# Patient Record
Sex: MALE | Race: WHITE | NOT HISPANIC OR LATINO | Employment: FULL TIME | URBAN - METROPOLITAN AREA
[De-identification: names, ages, dates, MRNs, and addresses within clinical notes are randomized per-mention and may not be internally consistent; named-entity substitution may affect disease eponyms.]

---

## 2023-10-14 DIAGNOSIS — I10 PRIMARY HYPERTENSION: Primary | ICD-10-CM

## 2023-10-14 RX ORDER — LISINOPRIL 5 MG/1
5 TABLET ORAL DAILY
Qty: 30 TABLET | Refills: 11 | Status: SHIPPED | OUTPATIENT
Start: 2023-10-14 | End: 2024-01-17 | Stop reason: SDUPTHER

## 2023-11-08 DIAGNOSIS — E78.5 HYPERLIPIDEMIA, UNSPECIFIED HYPERLIPIDEMIA TYPE: Primary | ICD-10-CM

## 2023-11-08 RX ORDER — ATORVASTATIN CALCIUM 80 MG/1
80 TABLET, FILM COATED ORAL NIGHTLY
Qty: 30 TABLET | Refills: 0 | Status: SHIPPED | OUTPATIENT
Start: 2023-11-08 | End: 2024-01-05

## 2024-01-03 DIAGNOSIS — E78.5 HYPERLIPIDEMIA, UNSPECIFIED HYPERLIPIDEMIA TYPE: ICD-10-CM

## 2024-01-05 RX ORDER — ATORVASTATIN CALCIUM 80 MG/1
80 TABLET, FILM COATED ORAL NIGHTLY
Qty: 30 TABLET | Refills: 0 | Status: SHIPPED | OUTPATIENT
Start: 2024-01-05 | End: 2024-01-17 | Stop reason: SDUPTHER

## 2024-01-16 PROBLEM — F41.9 ANXIETY: Status: ACTIVE | Noted: 2024-01-16

## 2024-01-16 PROBLEM — E66.811 OBESITY, CLASS I, BMI 30-34.9: Status: RESOLVED | Noted: 2018-11-29 | Resolved: 2024-01-16

## 2024-01-16 PROBLEM — I10 HYPERTENSION: Status: ACTIVE | Noted: 2024-01-16

## 2024-01-16 PROBLEM — K91.850 ILEAL POUCHITIS (MULTI): Status: RESOLVED | Noted: 2024-01-16 | Resolved: 2024-01-16

## 2024-01-16 PROBLEM — E66.9 OBESITY, CLASS I, BMI 30-34.9: Status: RESOLVED | Noted: 2018-11-29 | Resolved: 2024-01-16

## 2024-01-16 PROBLEM — R60.9 PERIPHERAL EDEMA: Status: RESOLVED | Noted: 2024-01-16 | Resolved: 2024-01-16

## 2024-01-16 PROBLEM — R39.9 LOWER URINARY TRACT SYMPTOMS: Status: RESOLVED | Noted: 2024-01-16 | Resolved: 2024-01-16

## 2024-01-16 PROBLEM — E78.5 DYSLIPIDEMIA: Status: ACTIVE | Noted: 2024-01-16

## 2024-01-16 PROBLEM — L40.9 PSORIASIS: Status: ACTIVE | Noted: 2024-01-16

## 2024-01-16 PROBLEM — K21.9 ACID REFLUX: Status: ACTIVE | Noted: 2024-01-16

## 2024-01-16 PROBLEM — R60.0 PERIPHERAL EDEMA: Status: RESOLVED | Noted: 2024-01-16 | Resolved: 2024-01-16

## 2024-01-16 PROBLEM — E78.5 HYPERLIPIDEMIA: Status: ACTIVE | Noted: 2024-01-16

## 2024-01-16 PROBLEM — G47.33 OBSTRUCTIVE SLEEP APNEA SYNDROME: Status: ACTIVE | Noted: 2024-01-16

## 2024-01-16 PROBLEM — Z90.49 H/O TOTAL COLECTOMY: Status: RESOLVED | Noted: 2024-01-16 | Resolved: 2024-01-16

## 2024-01-16 PROBLEM — K70.0 FATTY LIVER, ALCOHOLIC: Status: ACTIVE | Noted: 2024-01-16

## 2024-01-16 PROBLEM — I21.4 NON-ST ELEVATION MYOCARDIAL INFARCTION (NSTEMI) IN RECOVERY PHASE (MULTI): Status: RESOLVED | Noted: 2024-01-16 | Resolved: 2024-01-16

## 2024-01-17 ENCOUNTER — LAB (OUTPATIENT)
Dept: LAB | Facility: LAB | Age: 59
End: 2024-01-17
Payer: COMMERCIAL

## 2024-01-17 ENCOUNTER — OFFICE VISIT (OUTPATIENT)
Dept: PRIMARY CARE | Facility: CLINIC | Age: 59
End: 2024-01-17
Payer: COMMERCIAL

## 2024-01-17 VITALS
WEIGHT: 216 LBS | HEIGHT: 68 IN | BODY MASS INDEX: 32.74 KG/M2 | OXYGEN SATURATION: 97 % | HEART RATE: 98 BPM | SYSTOLIC BLOOD PRESSURE: 124 MMHG | DIASTOLIC BLOOD PRESSURE: 82 MMHG

## 2024-01-17 DIAGNOSIS — K21.9 GASTROESOPHAGEAL REFLUX DISEASE, UNSPECIFIED WHETHER ESOPHAGITIS PRESENT: ICD-10-CM

## 2024-01-17 DIAGNOSIS — Z00.00 HEALTHCARE MAINTENANCE: Primary | ICD-10-CM

## 2024-01-17 DIAGNOSIS — Z00.00 HEALTHCARE MAINTENANCE: ICD-10-CM

## 2024-01-17 DIAGNOSIS — I10 PRIMARY HYPERTENSION: ICD-10-CM

## 2024-01-17 DIAGNOSIS — E78.5 HYPERLIPIDEMIA, UNSPECIFIED HYPERLIPIDEMIA TYPE: ICD-10-CM

## 2024-01-17 LAB
ALBUMIN SERPL BCP-MCNC: 4.7 G/DL (ref 3.4–5)
ALP SERPL-CCNC: 66 U/L (ref 33–136)
ALT SERPL W P-5'-P-CCNC: 32 U/L (ref 10–52)
ANION GAP SERPL CALC-SCNC: 14 MMOL/L (ref 10–20)
AST SERPL W P-5'-P-CCNC: 27 U/L (ref 9–39)
BASOPHILS # BLD AUTO: 0.05 X10*3/UL (ref 0–0.1)
BASOPHILS NFR BLD AUTO: 0.9 %
BILIRUB SERPL-MCNC: 0.7 MG/DL (ref 0–1.2)
BUN SERPL-MCNC: 10 MG/DL (ref 6–23)
CALCIUM SERPL-MCNC: 10 MG/DL (ref 8.6–10.6)
CHLORIDE SERPL-SCNC: 103 MMOL/L (ref 98–107)
CHOLEST SERPL-MCNC: 166 MG/DL (ref 0–199)
CHOLESTEROL/HDL RATIO: 3.6
CO2 SERPL-SCNC: 26 MMOL/L (ref 21–32)
CREAT SERPL-MCNC: 1.13 MG/DL (ref 0.5–1.3)
EGFRCR SERPLBLD CKD-EPI 2021: 74 ML/MIN/1.73M*2
EOSINOPHIL # BLD AUTO: 0.32 X10*3/UL (ref 0–0.7)
EOSINOPHIL NFR BLD AUTO: 5.9 %
ERYTHROCYTE [DISTWIDTH] IN BLOOD BY AUTOMATED COUNT: 13 % (ref 11.5–14.5)
EST. AVERAGE GLUCOSE BLD GHB EST-MCNC: 111 MG/DL
GLUCOSE SERPL-MCNC: 98 MG/DL (ref 74–99)
HBA1C MFR BLD: 5.5 %
HCT VFR BLD AUTO: 44.4 % (ref 41–52)
HDLC SERPL-MCNC: 46.5 MG/DL
HGB BLD-MCNC: 14.4 G/DL (ref 13.5–17.5)
IMM GRANULOCYTES # BLD AUTO: 0.03 X10*3/UL (ref 0–0.7)
IMM GRANULOCYTES NFR BLD AUTO: 0.6 % (ref 0–0.9)
LDLC SERPL CALC-MCNC: 84 MG/DL
LYMPHOCYTES # BLD AUTO: 1.53 X10*3/UL (ref 1.2–4.8)
LYMPHOCYTES NFR BLD AUTO: 28.1 %
MCH RBC QN AUTO: 29.9 PG (ref 26–34)
MCHC RBC AUTO-ENTMCNC: 32.4 G/DL (ref 32–36)
MCV RBC AUTO: 92 FL (ref 80–100)
MONOCYTES # BLD AUTO: 0.41 X10*3/UL (ref 0.1–1)
MONOCYTES NFR BLD AUTO: 7.5 %
NEUTROPHILS # BLD AUTO: 3.1 X10*3/UL (ref 1.2–7.7)
NEUTROPHILS NFR BLD AUTO: 57 %
NON HDL CHOLESTEROL: 120 MG/DL (ref 0–149)
NRBC BLD-RTO: 0 /100 WBCS (ref 0–0)
PLATELET # BLD AUTO: 295 X10*3/UL (ref 150–450)
POTASSIUM SERPL-SCNC: 4.2 MMOL/L (ref 3.5–5.3)
PROT SERPL-MCNC: 7.6 G/DL (ref 6.4–8.2)
PSA SERPL-MCNC: 0.65 NG/ML
RBC # BLD AUTO: 4.81 X10*6/UL (ref 4.5–5.9)
SODIUM SERPL-SCNC: 139 MMOL/L (ref 136–145)
T4 FREE SERPL-MCNC: 1 NG/DL (ref 0.78–1.48)
TRIGL SERPL-MCNC: 178 MG/DL (ref 0–149)
TSH SERPL-ACNC: 2.26 MIU/L (ref 0.44–3.98)
VLDL: 36 MG/DL (ref 0–40)
WBC # BLD AUTO: 5.4 X10*3/UL (ref 4.4–11.3)

## 2024-01-17 PROCEDURE — 83036 HEMOGLOBIN GLYCOSYLATED A1C: CPT

## 2024-01-17 PROCEDURE — 80061 LIPID PANEL: CPT

## 2024-01-17 PROCEDURE — 3074F SYST BP LT 130 MM HG: CPT | Performed by: INTERNAL MEDICINE

## 2024-01-17 PROCEDURE — 3079F DIAST BP 80-89 MM HG: CPT | Performed by: INTERNAL MEDICINE

## 2024-01-17 PROCEDURE — 85025 COMPLETE CBC W/AUTO DIFF WBC: CPT

## 2024-01-17 PROCEDURE — 80053 COMPREHEN METABOLIC PANEL: CPT

## 2024-01-17 PROCEDURE — 1036F TOBACCO NON-USER: CPT | Performed by: INTERNAL MEDICINE

## 2024-01-17 PROCEDURE — 84443 ASSAY THYROID STIM HORMONE: CPT

## 2024-01-17 PROCEDURE — 36415 COLL VENOUS BLD VENIPUNCTURE: CPT

## 2024-01-17 PROCEDURE — 84153 ASSAY OF PSA TOTAL: CPT

## 2024-01-17 PROCEDURE — 99214 OFFICE O/P EST MOD 30 MIN: CPT | Performed by: INTERNAL MEDICINE

## 2024-01-17 PROCEDURE — 84439 ASSAY OF FREE THYROXINE: CPT

## 2024-01-17 RX ORDER — LISINOPRIL 5 MG/1
5 TABLET ORAL DAILY
Qty: 30 TABLET | Refills: 11 | Status: SHIPPED | OUTPATIENT
Start: 2024-01-17 | End: 2025-01-16

## 2024-01-17 RX ORDER — ATORVASTATIN CALCIUM 80 MG/1
80 TABLET, FILM COATED ORAL NIGHTLY
Qty: 30 TABLET | Refills: 0 | Status: SHIPPED | OUTPATIENT
Start: 2024-01-17 | End: 2024-03-06

## 2024-01-17 RX ORDER — PANTOPRAZOLE SODIUM 40 MG/1
40 TABLET, DELAYED RELEASE ORAL DAILY
Qty: 90 TABLET | Refills: 1 | Status: SHIPPED | OUTPATIENT
Start: 2024-01-17 | End: 2024-04-16

## 2024-01-17 RX ORDER — NAPROXEN SODIUM 220 MG/1
1 TABLET, FILM COATED ORAL DAILY
COMMUNITY
Start: 2021-07-20

## 2024-01-17 RX ORDER — LOPERAMIDE HYDROCHLORIDE 2 MG/1
4 CAPSULE ORAL 4 TIMES DAILY
COMMUNITY

## 2024-01-17 RX ORDER — METOPROLOL SUCCINATE 25 MG/1
25 TABLET, EXTENDED RELEASE ORAL DAILY
Qty: 90 TABLET | Refills: 1 | Status: SHIPPED | OUTPATIENT
Start: 2024-01-17

## 2024-01-17 RX ORDER — FLUOCINONIDE TOPICAL SOLUTION USP, 0.05% 0.5 MG/ML
SOLUTION TOPICAL
COMMUNITY

## 2024-01-17 RX ORDER — METOPROLOL SUCCINATE 25 MG/1
25 TABLET, EXTENDED RELEASE ORAL DAILY
COMMUNITY
End: 2024-01-17 | Stop reason: SDUPTHER

## 2024-01-17 RX ORDER — CLOBETASOL PROPIONATE 0.5 MG/G
CREAM TOPICAL
COMMUNITY

## 2024-01-17 ASSESSMENT — PATIENT HEALTH QUESTIONNAIRE - PHQ9
2. FEELING DOWN, DEPRESSED OR HOPELESS: NOT AT ALL
1. LITTLE INTEREST OR PLEASURE IN DOING THINGS: NOT AT ALL
SUM OF ALL RESPONSES TO PHQ9 QUESTIONS 1 AND 2: 0

## 2024-01-17 NOTE — PROGRESS NOTES
Subjective   Patient ID: Marcelino Mallory is a 59 y.o. male who presents for check up (Wants blood work done.) and Med Refill.  HPI  male who presents to establish care. Past medical history of hypertension, dyslipidemia, acid reflux, ulcerative colitis, and MARK. Surgical history total colectomy and ileostomy, restorative proctocolectomy/J-pouch in 2015. NSTEMI with KEAGAN in 2021.     Patient states he had chronic tachycardia high pulse over the years grade 3 out of 10 nothing makes better or worse states he is supposed to see cardiology in the next couple weeks  Denies chest pain dyspnea lightheadedness dyspnea syncope presyncope              Health Maintenance:      Colonoscopy: Unknown      Mammogram:      Pelvic/Pap:      Low dose chest CT:      Aorta duplex:      Optho:      Podiatry:        Vaccines:      Prevnar 20:      Prevnar 13:      Pneumovax 23:      Tdap:      Shingrix:      COVID:      Influenza:        ROS:      General: Fatigue he states it was better for 2 weeks after having a stent 2 years ago but then it came back some generalized fatigue denies fever/chills/weight loss      Head: denies HA/trauma/masses/dizziness      Eyes: denies vision change/loss of vision/blurry vision/diplopia/eye pain      Ears: denies hearing loss/tinnitus/otalgia/otorrhea      Nose: denies nasal drainage/anosmia      Throat: denies dysphagia/odynophagia      Lymphatics: denies lymph node swelling      Cardiac: Chronic tachycardia over the years denies CP/palpitations/orthopnea/PND      Pulmonary: denies dyspnea/cough/wheezing      GI: Chronic frequent bowel movements 20 a day since having J-pouch colectomy uncontrolled acidic reflux in the back of the throat is better with the PPI before he states this symptom is different than when he had a heart attack it does not feel the same feels like acid reflux chronic intermittent hernias since having J-pouch hernia surgery years ago asymptomatic denies abd  "pain/n/v/diarrhea/melena/hematochezia/hematemesis      : denies dysuria/hematuria/change frequency      Genital: denies genital discharge/lesions      Skin: denies rashes/lesions/masses      MSK: denies weakness/swelling/edema/gait imbalance/pain      Neuro: denies paresthesias/seizures/dysarthria      Psych: denies depression/anxiety/suicidal or homicidal ideations            Objective   /82   Pulse 98   Ht 1.727 m (5' 8\")   Wt 98 kg (216 lb)   SpO2 97%   BMI 32.84 kg/m²      Physical Exam:     General: AO3, NAD     Head: atraumatic/NC     Eyes: EOMI/PERRLA. Negative APD     Ears: TM pearly gray, EAC clear. No lesions or erythema     Nose: symmetric nares, no discharge     Throat: trachea midline, uvula midline pink mucosa. No thyromegaly     Lymphatics: no cervical/supraclavicular/ant or posterior cervical adenopathy/axillary/inguinal adenopathy     Breast: not examined     Chest: no deformity or tenderness to palpation     Pulm: CTA b/l, no wheeze/rhonchi/rales. nonlabored     Cardiac: RRR +s1s2, no m/r/g.      GI: Abdominal scar is clean dry intact reducible ventral hernia times two 1 cm soft, NT/ND. Normoactive Bsx4. No rebound/guarding.     Rectal: no examined     MSK: 5/5 strength UE LE. No edema/clubbing/cyanosis     Skin: no rashes/lesions     Vascular: 2+ palp DP PT radials b/l. Negative carotid bruit     Neuro: CNII-XII intact. No focal deficits. Reflexes 2/4 brachioradialis bicep tricep patellar achilles. Finger to nose intact.     Psych: appropriate mood/affect                    No results found for: \"BMPR1A\", \"CBCDIF\"      Assessment/Plan   Diagnoses and all orders for this visit:  Healthcare maintenance  -     CBC and Auto Differential; Future  -     Comprehensive Metabolic Panel; Future  -     Hemoglobin A1C; Future  -     Lipid Panel; Future  -     Thyroxine, Free; Future  -     Thyroid Stimulating Hormone; Future  -     Prostate Specific Antigen, Screen; Future  Hyperlipidemia, " unspecified hyperlipidemia type  -     atorvastatin (Lipitor) 80 mg tablet; Take 1 tablet (80 mg) by mouth once daily at bedtime.  Primary hypertension  -     lisinopril 5 mg tablet; Take 1 tablet (5 mg) by mouth once daily.  -     metoprolol succinate XL (Toprol-XL) 25 mg 24 hr tablet; Take 1 tablet (25 mg) by mouth once daily.  Gastroesophageal reflux disease, unspecified whether esophagitis present  -     pantoprazole (ProtoNix) 40 mg EC tablet; Take 1 tablet (40 mg) by mouth once daily. Do not crush, chew, or split.       Recommend calling and following up with colorectal surgery Dr. Salmeron as he stated you prefer to go through the GI surgeons regarding a colonoscopy evaluation    Call and follow-up with urology    Call follow-up with cardiology    Thank you for making appointment today Marcelino    Please follow-up 6 months    Abebe Chaudhari DO, VANESSA Gallardo MA

## 2024-01-25 ENCOUNTER — TELEPHONE (OUTPATIENT)
Dept: PRIMARY CARE | Facility: CLINIC | Age: 59
End: 2024-01-25
Payer: COMMERCIAL

## 2024-01-25 NOTE — TELEPHONE ENCOUNTER
L/M for patient with results.    ----- Message from Abebe Chaudhari DO sent at 1/24/2024  5:45 PM EST -----  Caridad  Please notify currently available lab work is stable normal if any further questions or would like an in office result review please schedule follow-up in the next 2 to 4 weeks thank you

## 2024-03-05 DIAGNOSIS — E78.5 HYPERLIPIDEMIA, UNSPECIFIED HYPERLIPIDEMIA TYPE: ICD-10-CM

## 2024-03-06 RX ORDER — ATORVASTATIN CALCIUM 80 MG/1
TABLET, FILM COATED ORAL
Qty: 90 TABLET | Refills: 1 | Status: SHIPPED | OUTPATIENT
Start: 2024-03-06

## 2024-03-12 ENCOUNTER — OFFICE VISIT (OUTPATIENT)
Dept: SURGERY | Facility: CLINIC | Age: 59
End: 2024-03-12
Payer: COMMERCIAL

## 2024-03-12 VITALS
DIASTOLIC BLOOD PRESSURE: 75 MMHG | WEIGHT: 217 LBS | SYSTOLIC BLOOD PRESSURE: 133 MMHG | TEMPERATURE: 97.8 F | OXYGEN SATURATION: 95 % | HEART RATE: 93 BPM | HEIGHT: 68 IN | BODY MASS INDEX: 32.89 KG/M2

## 2024-03-12 DIAGNOSIS — K91.850 POUCHITIS (MULTI): Primary | ICD-10-CM

## 2024-03-12 PROCEDURE — 3078F DIAST BP <80 MM HG: CPT | Performed by: COLON & RECTAL SURGERY

## 2024-03-12 PROCEDURE — 1036F TOBACCO NON-USER: CPT | Performed by: COLON & RECTAL SURGERY

## 2024-03-12 PROCEDURE — 99212 OFFICE O/P EST SF 10 MIN: CPT | Performed by: COLON & RECTAL SURGERY

## 2024-03-12 PROCEDURE — 3075F SYST BP GE 130 - 139MM HG: CPT | Performed by: COLON & RECTAL SURGERY

## 2024-03-12 RX ORDER — METRONIDAZOLE 500 MG/1
500 TABLET ORAL 3 TIMES DAILY
Qty: 30 TABLET | Refills: 0 | Status: SHIPPED | OUTPATIENT
Start: 2024-03-12 | End: 2024-03-22

## 2024-03-12 ASSESSMENT — ENCOUNTER SYMPTOMS
CONFUSION: 0
VOMITING: 0
ABDOMINAL DISTENTION: 0
UNEXPECTED WEIGHT CHANGE: 0
LIGHT-HEADEDNESS: 0
CONSTIPATION: 0
ABDOMINAL PAIN: 0
DIFFICULTY URINATING: 0
RECTAL PAIN: 0
ARTHRALGIAS: 0
ACTIVITY CHANGE: 0
DYSURIA: 0
SORE THROAT: 0
DIARRHEA: 0
OCCASIONAL FEELINGS OF UNSTEADINESS: 0
WOUND: 0
DEPRESSION: 0
PALPITATIONS: 0
FEVER: 0
DIZZINESS: 0
WEAKNESS: 0
POLYDIPSIA: 0
APPETITE CHANGE: 0
FATIGUE: 0
ANAL BLEEDING: 0
ADENOPATHY: 0
LOSS OF SENSATION IN FEET: 0
BLOOD IN STOOL: 0
SHORTNESS OF BREATH: 0
NAUSEA: 0

## 2024-03-12 ASSESSMENT — PATIENT HEALTH QUESTIONNAIRE - PHQ9
1. LITTLE INTEREST OR PLEASURE IN DOING THINGS: NOT AT ALL
2. FEELING DOWN, DEPRESSED OR HOPELESS: NOT AT ALL
SUM OF ALL RESPONSES TO PHQ9 QUESTIONS 1 & 2: 0

## 2024-03-12 ASSESSMENT — PAIN SCALES - GENERAL: PAINLEVEL: 0-NO PAIN

## 2024-03-12 NOTE — PROGRESS NOTES
Chief Complaint:  ileal pouch      History Of Present Illness  Marcelino Mallory is a 59 y.o. male with history of Ulcerative Colitis who is status post 3 stage restorative proctocolectomy in 2015. Last pouchoscopy on 2/23/21 showed: Mild to moderate pouchitis with erythema, scattered apthous ulcers.     Presents to clinic today for pouch surveillance.     No changes. 20 bm/day. No incontinence.      Past Medical History  He has a past medical history of Abdominal adhesions (07/06/2015), Chest pain (07/09/2015), Diarrhea (07/16/2015), H/O total colectomy (01/16/2024), Hypokalemia (07/09/2015), Hypophosphatemia (06/30/2014), Lower urinary tract symptoms (01/16/2024), Malnutrition of moderate degree (CMS/Formerly [facility]) (07/16/2015), Obesity, Class I, BMI 30-34.9 (11/29/2018), Oral thrush (07/15/2015), Postoperative ileus (CMS/Formerly [facility]) (07/14/2015), Tachycardia (06/28/2014), and Ulcerative colitis (CMS/Formerly [facility]).    Surgical History  He has a past surgical history that includes Other surgical history (02/23/2021) and Other surgical history (02/23/2021).     Social History  He reports that he quit smoking about 30 years ago. His smoking use included cigarettes. He has a 30.00 pack-year smoking history. He has never used smokeless tobacco. He reports current alcohol use of about 12.0 standard drinks of alcohol per week. He reports that he does not use drugs.    Family History  No family history on file.     Allergies  Morphine (bulk)    Home Medications  Prior to Admission medications    Medication Sig Start Date End Date Taking? Authorizing Provider   aspirin 81 mg chewable tablet Chew 1 tablet (81 mg) once daily. 7/20/21   Historical Provider, MD   atorvastatin (Lipitor) 80 mg tablet TAKE ONE TABLET (80 MG) BY MOUTH ONCE DAILY AT BEDTIME. NEEDS APPOINTMENT FOR FURTHER REFILLS 3/6/24   Abebe Chaudhari,    clobetasol (Temovate) 0.05 % cream apply to affected area twice a day if needed for FLARES    Historical Provider, MD   fluocinonide  (Lidex) 0.05 % external solution apply to affected area ON scalp twice a day if needed    Historical Provider, MD   lisinopril 5 mg tablet Take 1 tablet (5 mg) by mouth once daily. 1/17/24 1/16/25  Abebe D Chaudhari, DO   loperamide (Imodium) 2 mg capsule Take 2 capsules (4 mg) by mouth 4 times a day.    Historical Provider, MD   metoprolol succinate XL (Toprol-XL) 25 mg 24 hr tablet Take 1 tablet (25 mg) by mouth once daily. 1/17/24   Abebe D Chaudhari, DO   pantoprazole (ProtoNix) 40 mg EC tablet Take 1 tablet (40 mg) by mouth once daily. Do not crush, chew, or split. 1/17/24 4/16/24  Abebe D Chaudhari, DO   atorvastatin (Lipitor) 80 mg tablet Take 1 tablet (80 mg) by mouth once daily at bedtime. 1/17/24 3/6/24  Abebe D Chaudhari, DO         Review of Systems   Constitutional:  Negative for activity change, appetite change, fatigue, fever and unexpected weight change.   HENT:  Negative for sore throat.    Eyes:  Negative for visual disturbance.   Respiratory:  Negative for shortness of breath.    Cardiovascular:  Negative for chest pain, palpitations and leg swelling.   Gastrointestinal:  Negative for abdominal distention, abdominal pain, anal bleeding, blood in stool, constipation, diarrhea, nausea, rectal pain and vomiting.   Endocrine: Negative for polydipsia.   Genitourinary:  Negative for difficulty urinating and dysuria.   Musculoskeletal:  Negative for arthralgias.   Skin:  Negative for wound.   Neurological:  Negative for dizziness, weakness and light-headedness.   Hematological:  Negative for adenopathy.   Psychiatric/Behavioral:  Negative for confusion.      Pouchoscopy In Clinic    Date/Time: 3/12/2024 1:43 PM    Performed by: Stefan Salmeron MD  Authorized by: Stefan Salmeron MD    Consent:     Consent obtained:  Written    Consent given by:  Patient  Procedure specific details:      Flexible pouchoscopy:  Patient signed consent. Digital exam with normal IPAA. Flexible scope inserted thru anus and advanced into pouch.  IPAA widely patient. Normal JOSHUA. Pouch mucosa mildly inflamed with contact bleeding.. Afferent limb inspected and is normal. Patient tolerated procedure well.        Imaging:  No results found.       Labs:   Lab Results   Component Value Date    AST 27 01/17/2024    ALT 32 01/17/2024    ALKPHOS 66 01/17/2024    PROT 7.6 01/17/2024    ALBUMIN 4.7 01/17/2024          Physical Exam  Vitals reviewed. Exam conducted with a chaperone present.   Constitutional:       Appearance: Normal appearance.   Cardiovascular:      Rate and Rhythm: Normal rate and regular rhythm.   Pulmonary:      Effort: Pulmonary effort is normal.      Breath sounds: Normal breath sounds.   Abdominal:      General: Abdomen is flat. There is no distension.      Palpations: Abdomen is soft. There is no mass.      Tenderness: There is no abdominal tenderness.      Hernia: No hernia is present.   Genitourinary:     Rectum: Normal.   Neurological:      General: No focal deficit present.      Mental Status: He is alert.   Psychiatric:         Behavior: Behavior normal.            Last Recorded Vitals  There were no vitals taken for this visit.      Assessment/Plan  Pouchitis on today's scope. Will treat with 10 days of flagyl and reassess number of bms.        Dr. Stefan Salmeron   3/12/2024

## 2024-07-17 ENCOUNTER — APPOINTMENT (OUTPATIENT)
Dept: PRIMARY CARE | Facility: CLINIC | Age: 59
End: 2024-07-17
Payer: COMMERCIAL

## 2024-07-17 VITALS
DIASTOLIC BLOOD PRESSURE: 74 MMHG | SYSTOLIC BLOOD PRESSURE: 136 MMHG | HEART RATE: 98 BPM | BODY MASS INDEX: 32.99 KG/M2 | WEIGHT: 217 LBS

## 2024-07-17 DIAGNOSIS — M89.8X1 CLAVICLE PAIN: Primary | ICD-10-CM

## 2024-07-17 PROCEDURE — 99214 OFFICE O/P EST MOD 30 MIN: CPT | Performed by: INTERNAL MEDICINE

## 2024-07-17 PROCEDURE — 1036F TOBACCO NON-USER: CPT | Performed by: INTERNAL MEDICINE

## 2024-07-17 PROCEDURE — 3078F DIAST BP <80 MM HG: CPT | Performed by: INTERNAL MEDICINE

## 2024-07-17 PROCEDURE — 3075F SYST BP GE 130 - 139MM HG: CPT | Performed by: INTERNAL MEDICINE

## 2024-07-17 NOTE — PROGRESS NOTES
Subjective   Patient ID: Marcelino Mallory is a 59 y.o. male who presents for Follow-up and Med Refill.  HPI  male who presents to establish care. Past medical history of hypertension, dyslipidemia, acid reflux, ulcerative colitis, and MARK. Surgical history total colectomy and ileostomy, restorative proctocolectomy/J-pouch in 2015. NSTEMI with KEAGAN in 2021.     Patient describes intermittent left clavicle pain for the last 1 month none at present grade 0 out of 10 he does exercise quite a bit 5 to 6 days a week worse when he presses on the area little bit  He has some uncontrolled GERD acidic reflux occasionally he states when he had an MI in the past that was one of his symptoms no active symptoms today of acid reflux  Fatigue  Denies chest pain dyspnea nausea vomiting diaphoresis fever chills paralysis paresthesias              Health Maintenance:      Colonoscopy: 2024 pouchoscopy      Mammogram:      Pelvic/Pap:      Low dose chest CT:      Aorta duplex:      Optho:      Podiatry:        Vaccines:      Prevnar 20:      Prevnar 13:      Pneumovax 23:      Tdap:      Shingrix:      COVID:      Influenza:        ROS:      General: Fatigue denies fever/chills/weight loss      Head: denies HA/trauma/masses/dizziness      Eyes: denies vision change/loss of vision/blurry vision/diplopia/eye pain      Ears: denies hearing loss/tinnitus/otalgia/otorrhea      Nose: denies nasal drainage/anosmia      Throat: denies dysphagia/odynophagia      Lymphatics: denies lymph node swelling      Cardiac: Chronic tachycardia over the years sometimes gets into the high 90s denies CP/palpitations/orthopnea/PND      Pulmonary: denies dyspnea/cough/wheezing      GI: Intermittent acid reflux in the back of the throat not fully controlled denies abd pain/n/v/diarrhea/melena/hematochezia/hematemesis      : denies dysuria/hematuria/change frequency      Genital: denies genital discharge/lesions      Skin: denies rashes/lesions/masses      MSK:  "Some intermittent left clavicle pain over the last month denies weakness/swelling/edema/gait imbalance/pain      Neuro: denies paresthesias/seizures/dysarthria      Psych: denies depression/anxiety/suicidal or homicidal ideations            Objective   /74   Pulse 98   Wt 98.4 kg (217 lb)   BMI 32.99 kg/m²      Physical Exam:     General: AO3, NAD     Head: atraumatic/NC     Eyes: EOMI/PERRLA. Negative APD     Ears: TM pearly gray, EAC clear. No lesions or erythema     Nose: symmetric nares, no discharge     Throat: trachea midline, uvula midline pink mucosa. No thyromegaly     Lymphatics: no cervical/supraclavicular/ant or posterior cervical adenopathy/axillary/inguinal adenopathy     Breast: not examined     Chest: no deformity or tenderness to palpation     Pulm: CTA b/l, no wheeze/rhonchi/rales. nonlabored     Cardiac: RRR +s1s2, no m/r/g.      GI: Abdominal scar is clean dry intact reducible ventral hernia left upper quadrant 1 cm soft, NT/ND. Normoactive Bsx4. No rebound/guarding.     Rectal: no examined     MSK: Mild tender palpation mid to medial left clavicle 5/5 strength UE LE. No edema/clubbing/cyanosis     Skin: no rashes/lesions     Vascular: 2+ palp DP PT radials b/l. Negative carotid bruit     Neuro: CNII-XII intact. No focal deficits. Reflexes 2/4 brachioradialis bicep tricep patellar achilles. Finger to nose intact.     Psych: appropriate mood/affect                    No results found for: \"BMPR1A\", \"CBCDIF\"  Patient refused stress test with EKG testing or echo, states he is going to see the cardiologist next week we will talk about it then with the cardiologist he requested  Patient refused an x-ray of the left clavicle    Assessment/Plan   Diagnoses and all orders for this visit:  Clavicle pain  Comments:  Left-sided strain less likely fracture    Go to the ER for any chest pain shortness of breath or other concerning symptoms or severe uncontrolled acidic reflux  Follow-up with " cardiology next week as planned likely will need repeat ischemic workup stress testing versus catheterization versus other    Weight loss encouraged increase exercise 30 minutes a day     Call and follow-up with colorectal as planned recommendations noted 10 days of Flagyl      Call and follow-up with urology    Screening blood work due January 2025    Thank you for making appointment today Marcelino    Please follow-up 6 months    Abebe Chaudhari DO, VANESSA Chaudhari DO

## 2024-07-24 PROBLEM — R33.9 RETENTION OF URINE: Status: ACTIVE | Noted: 2024-07-24

## 2024-07-24 PROBLEM — K52.9 ACUTE GASTROENTERITIS: Status: ACTIVE | Noted: 2024-07-24

## 2024-07-24 PROBLEM — I25.2 HISTORY OF MYOCARDIAL INFARCTION: Status: ACTIVE | Noted: 2024-07-24

## 2024-07-24 RX ORDER — CHLORHEXIDINE GLUCONATE ORAL RINSE 1.2 MG/ML
SOLUTION DENTAL
COMMUNITY
Start: 2024-06-18

## 2024-07-25 ENCOUNTER — OFFICE VISIT (OUTPATIENT)
Dept: CARDIOLOGY | Facility: HOSPITAL | Age: 59
End: 2024-07-25
Payer: COMMERCIAL

## 2024-07-25 VITALS
SYSTOLIC BLOOD PRESSURE: 142 MMHG | DIASTOLIC BLOOD PRESSURE: 81 MMHG | OXYGEN SATURATION: 93 % | BODY MASS INDEX: 34.18 KG/M2 | HEART RATE: 92 BPM | WEIGHT: 217.8 LBS | HEIGHT: 67 IN

## 2024-07-25 DIAGNOSIS — I10 HYPERTENSION, UNSPECIFIED TYPE: Primary | ICD-10-CM

## 2024-07-25 DIAGNOSIS — R07.89 OTHER CHEST PAIN: ICD-10-CM

## 2024-07-25 DIAGNOSIS — I25.10 CORONARY ARTERY DISEASE INVOLVING NATIVE CORONARY ARTERY OF NATIVE HEART, UNSPECIFIED WHETHER ANGINA PRESENT: ICD-10-CM

## 2024-07-25 PROCEDURE — 93005 ELECTROCARDIOGRAM TRACING: CPT | Performed by: INTERNAL MEDICINE

## 2024-07-25 PROCEDURE — 93010 ELECTROCARDIOGRAM REPORT: CPT | Performed by: INTERNAL MEDICINE

## 2024-07-25 PROCEDURE — 3077F SYST BP >= 140 MM HG: CPT | Performed by: INTERNAL MEDICINE

## 2024-07-25 PROCEDURE — 99214 OFFICE O/P EST MOD 30 MIN: CPT | Performed by: INTERNAL MEDICINE

## 2024-07-25 PROCEDURE — 3079F DIAST BP 80-89 MM HG: CPT | Performed by: INTERNAL MEDICINE

## 2024-07-25 PROCEDURE — 3008F BODY MASS INDEX DOCD: CPT | Performed by: INTERNAL MEDICINE

## 2024-07-25 RX ORDER — NITROGLYCERIN 0.4 MG/1
0.4 TABLET SUBLINGUAL EVERY 5 MIN PRN
Qty: 100 TABLET | Refills: 11 | Status: SHIPPED | OUTPATIENT
Start: 2024-07-25 | End: 2025-07-25

## 2024-07-25 ASSESSMENT — PAIN SCALES - GENERAL: PAINLEVEL: 0-NO PAIN

## 2024-07-25 NOTE — PATIENT INSTRUCTIONS
Thank you for attending the Cardiology clinic at [facility] today. It was nice to meet you.     Your cardiovascular examination was within normal limits. Your ECG showed a normal heart rhythm.    Continue your current medications.     I have ordered an echocardiogram to assess your heart structure and function. Please call [phone] to schedule this test.     I have ordered a cardiac MRI to assess your heart structure and function. Please call [phone] to schedule this test.     I will notify you of your test results when available.

## 2024-07-25 NOTE — PROGRESS NOTES
Subjective   Marcelino Mallory is a 59 y.o. male who presents for cardiology follow-up on background of CAD withNSTEMI (7/19/2021) s/p PCI/KEAGAN to the prox-mid LAD (90% stenosis), DLD, MARK on CPAP, GERD, ulcerative colitis s/p colectomy.      Investigations:  TTE (7/2021) - LVEF 50-55%, septal-apical hypokinesia.   LHC (7/2021) - LAD prox-mid 90%, LCx small-medium vessel with disease in distal third of vessel. Resolute [place] 3.0 x 26mm KEAGAN deployed to prox/mid LAD.   Peak troponin 6.96.  Nuclear stress test (12/2021) - no ischemia.     Chief Complaint:  FUV, nausea/chest discomfort    HPI  Exercising regularly  - 5x per week, resistance and aerobic.   - no change in exercise capacity at gym.   - some difficulty with hills on bicycle (exertional dyspnea no chest pain).     Reports indigestion like symptoms in the morning  - increased over the last few weeks  - Feels nauseated/about to vomit, some relief with burping.   - on pantoprazole 40mg daily  - ?dyspnea vs anxiety.     MARK  - compliant with nasal CPAP.     CV risk:   HTN - controlled on lisinopril 5 mg daily.   LDL - 84 mg/dL, on atorvastatin 80mg. No diet restrictions, small portions.  Non-smoker.   HbA1c 5.5%     Shx - semi-retired. 3-4x/week has 3 drinks.   Diet - fairly unrestricted, enjoys red meat.    ROS  A 10-system review was performed and was unremarkable apart from what is presented in the HPI.     Objective   Physical Exam  Alert and orientated.   Appropriate responses, normal affect.  No respiratory distress at rest.   Skin warm and dry.   Normal radial pulse character and volume. Clinically SR.   Anicteric sclera, no conjunctival pallor.   No JVD or carotid bruits.  Heart sounds dual, no added heart sounds or audible murmurs.   Chest clear on auscultation.   Calves soft, non-tender.  No pedal edema.    Lab Review:   Lab Results   Component Value Date     01/17/2024    K 4.2 01/17/2024     01/17/2024    CO2 26 01/17/2024    BUN 10  01/17/2024    CREATININE 1.13 01/17/2024    GLUCOSE 98 01/17/2024    CALCIUM 10.0 01/17/2024     Lab Results   Component Value Date    TROPONINI <0.02 12/20/2021     Lab Results   Component Value Date    WBC 5.4 01/17/2024    HGB 14.4 01/17/2024    HCT 44.4 01/17/2024    MCV 92 01/17/2024     01/17/2024     Lab Results   Component Value Date    CHOL 166 01/17/2024    TRIG 178 (H) 01/17/2024    HDL 46.5 01/17/2024       Assessment/Plan   In summary, Marcelino Mallory is a 59 y.o. male who presents for cardiology follow-up on background of CAD withNSTEMI (7/19/2021) s/p PCI/KEAGAN to the prox-mid LAD (90% stenosis), DLD, MAKR on CPAP, GERD, ulcerative colitis s/p colectomy.  He presents with intermittent symptoms of nausea/indigestion in the mornings and he is concerned about an anginal equivalent.  He continues to exercise regularly with no definite change in his exercise capacity at the gym however he has noticed some exertional dyspnea when ascending inclines on his bicycle.  In clinic today he was euvolemic and normotensive.  His EKG showed sinus rhythm with first-degree heart block but no ST-T changes.  Overall his cardiovascular risk factors are satisfactorily controlled apart from an LDL that is slightly above target and addition of ezetimibe will be considered if dietary modification is not effective.  In light of his symptoms I have arranged for an echocardiogram and a stress cardiac MRI to assess for possible ischemia.  I will follow-up with Mr. Mallory after his investigations.

## 2024-07-28 LAB
ATRIAL RATE: 95 BPM
P AXIS: 69 DEGREES
P OFFSET: 174 MS
P ONSET: 113 MS
PR INTERVAL: 230 MS
Q ONSET: 228 MS
QRS COUNT: 16 BEATS
QRS DURATION: 98 MS
QT INTERVAL: 330 MS
QTC CALCULATION(BAZETT): 414 MS
QTC FREDERICIA: 384 MS
R AXIS: 75 DEGREES
T AXIS: 49 DEGREES
T OFFSET: 393 MS
VENTRICULAR RATE: 95 BPM

## 2024-08-15 DIAGNOSIS — K21.9 GASTROESOPHAGEAL REFLUX DISEASE, UNSPECIFIED WHETHER ESOPHAGITIS PRESENT: ICD-10-CM

## 2024-08-15 RX ORDER — PANTOPRAZOLE SODIUM 40 MG/1
TABLET, DELAYED RELEASE ORAL
Qty: 90 TABLET | Refills: 1 | Status: SHIPPED | OUTPATIENT
Start: 2024-08-15

## 2024-08-20 ENCOUNTER — HOSPITAL ENCOUNTER (OUTPATIENT)
Dept: CARDIOLOGY | Facility: HOSPITAL | Age: 59
Discharge: HOME | End: 2024-08-20
Payer: COMMERCIAL

## 2024-08-20 DIAGNOSIS — I25.10 CORONARY ARTERY DISEASE INVOLVING NATIVE CORONARY ARTERY OF NATIVE HEART, UNSPECIFIED WHETHER ANGINA PRESENT: ICD-10-CM

## 2024-08-20 DIAGNOSIS — R07.89 OTHER CHEST PAIN: ICD-10-CM

## 2024-08-20 PROCEDURE — 93306 TTE W/DOPPLER COMPLETE: CPT | Performed by: INTERNAL MEDICINE

## 2024-08-20 PROCEDURE — 93306 TTE W/DOPPLER COMPLETE: CPT

## 2024-08-22 LAB
AORTIC VALVE PEAK VELOCITY: 1.14 M/S
AV PEAK GRADIENT: 5.2 MMHG
AVA (PEAK VEL): 2.93 CM2
EJECTION FRACTION APICAL 4 CHAMBER: 53
EJECTION FRACTION: 55 %
LEFT VENTRICLE INTERNAL DIMENSION DIASTOLE: 4.3 CM (ref 3.5–6)
LEFT VENTRICULAR OUTFLOW TRACT DIAMETER: 2.3 CM
MITRAL VALVE E/A RATIO: 1.2
RIGHT VENTRICLE FREE WALL PEAK S': 12.5 CM/S
RIGHT VENTRICLE PEAK SYSTOLIC PRESSURE: 22.4 MMHG
TRICUSPID ANNULAR PLANE SYSTOLIC EXCURSION: 1.7 CM

## 2024-08-27 ENCOUNTER — TELEPHONE (OUTPATIENT)
Dept: CARDIOLOGY | Facility: HOSPITAL | Age: 59
End: 2024-08-27
Payer: COMMERCIAL

## 2024-09-12 DIAGNOSIS — E78.5 HYPERLIPIDEMIA, UNSPECIFIED HYPERLIPIDEMIA TYPE: ICD-10-CM

## 2024-09-12 DIAGNOSIS — I10 PRIMARY HYPERTENSION: ICD-10-CM

## 2024-09-12 RX ORDER — METOPROLOL SUCCINATE 25 MG/1
25 TABLET, EXTENDED RELEASE ORAL DAILY
Qty: 90 TABLET | Refills: 0 | Status: SHIPPED | OUTPATIENT
Start: 2024-09-12

## 2024-09-12 RX ORDER — ATORVASTATIN CALCIUM 80 MG/1
TABLET, FILM COATED ORAL
Qty: 90 TABLET | Refills: 0 | Status: SHIPPED | OUTPATIENT
Start: 2024-09-12

## 2024-09-24 ENCOUNTER — TELEPHONE (OUTPATIENT)
Dept: PRIMARY CARE | Facility: CLINIC | Age: 59
End: 2024-09-24
Payer: COMMERCIAL

## 2024-09-24 DIAGNOSIS — K52.9 CHRONIC DIARRHEA: Primary | ICD-10-CM

## 2024-09-24 RX ORDER — LOPERAMIDE HYDROCHLORIDE 2 MG/1
4 CAPSULE ORAL 4 TIMES DAILY
Qty: 120 CAPSULE | Refills: 2 | Status: SHIPPED | OUTPATIENT
Start: 2024-09-24 | End: 2024-10-24

## 2024-09-24 NOTE — TELEPHONE ENCOUNTER
Called to see if you would fill lomotil that had been prescribed by Dr Salmeron, who is no longer with .

## 2024-10-03 ENCOUNTER — HOSPITAL ENCOUNTER (OUTPATIENT)
Dept: RADIOLOGY | Facility: HOSPITAL | Age: 59
Discharge: HOME | End: 2024-10-03
Payer: COMMERCIAL

## 2024-10-03 VITALS — HEIGHT: 67 IN | WEIGHT: 216.05 LBS | BODY MASS INDEX: 33.91 KG/M2

## 2024-10-03 DIAGNOSIS — I25.10 CORONARY ARTERY DISEASE INVOLVING NATIVE CORONARY ARTERY OF NATIVE HEART, UNSPECIFIED WHETHER ANGINA PRESENT: ICD-10-CM

## 2024-10-03 DIAGNOSIS — R07.89 OTHER CHEST PAIN: ICD-10-CM

## 2024-10-03 DIAGNOSIS — I10 HYPERTENSION, UNSPECIFIED TYPE: ICD-10-CM

## 2024-10-03 PROCEDURE — A9575 INJ GADOTERATE MEGLUMI 0.1ML: HCPCS | Performed by: INTERNAL MEDICINE

## 2024-10-03 PROCEDURE — 2550000001 HC RX 255 CONTRASTS: Performed by: INTERNAL MEDICINE

## 2024-10-03 PROCEDURE — 75563 CARD MRI W/STRESS IMG & DYE: CPT

## 2024-10-03 RX ORDER — GADOTERATE MEGLUMINE 376.9 MG/ML
39 INJECTION INTRAVENOUS
Status: COMPLETED | OUTPATIENT
Start: 2024-10-03 | End: 2024-10-03

## 2024-10-03 NOTE — NURSING NOTE
MRI STRESS        Stress protocol: Regadenoson  Regadenoson Dose: 0.4mg  Aminophylline Dose: 100mg     Resting Vitals:  BP: 146/86  HR: 99bpm  SPO2: 95% RA  Resting ECG: NSR with left axis deviation     Stress:  0.4mg IV push Regadenoson:  1 min: /81 HR 117bpm 97% RA symptoms: SOB noted  2 min: /75  HR 112bpm 98% RA symptoms: SOB noted     Reversal/Recovery:  100mg IV push Aminophylline:  1 min: /79 HR 106bpm 98% RA symptoms: SOB resolve. No other symptoms noted  2 min: /77 HR 98bpm 97% RA symptoms: none  4 min: /77  HR 99bpm 95% RA symptoms: none  6 min: /80  HR 97pm 92% RA symptoms: none     Patients resting HR of 99 bpm marlo to a maximum of 117 bpm.  Resting BP of 146/86 reached a maximum of 166/81. Patients post stress EKG remained unchanged.  Patient discharged from the [facility] to home.

## 2024-11-13 DIAGNOSIS — I10 PRIMARY HYPERTENSION: ICD-10-CM

## 2024-11-15 RX ORDER — LISINOPRIL 5 MG/1
5 TABLET ORAL DAILY
Qty: 30 TABLET | Refills: 5 | Status: SHIPPED | OUTPATIENT
Start: 2024-11-15

## 2024-12-16 DIAGNOSIS — I10 PRIMARY HYPERTENSION: ICD-10-CM

## 2024-12-16 DIAGNOSIS — E78.5 HYPERLIPIDEMIA, UNSPECIFIED HYPERLIPIDEMIA TYPE: ICD-10-CM

## 2024-12-18 RX ORDER — ATORVASTATIN CALCIUM 80 MG/1
TABLET, FILM COATED ORAL
Qty: 90 TABLET | Refills: 0 | Status: SHIPPED | OUTPATIENT
Start: 2024-12-18

## 2024-12-18 RX ORDER — METOPROLOL SUCCINATE 25 MG/1
25 TABLET, EXTENDED RELEASE ORAL DAILY
Qty: 21 TABLET | Refills: 0 | Status: SHIPPED | OUTPATIENT
Start: 2024-12-18

## 2025-01-14 DIAGNOSIS — I10 PRIMARY HYPERTENSION: ICD-10-CM

## 2025-01-15 RX ORDER — METOPROLOL SUCCINATE 25 MG/1
TABLET, EXTENDED RELEASE ORAL
Qty: 21 TABLET | Refills: 0 | Status: SHIPPED | OUTPATIENT
Start: 2025-01-15

## 2025-02-11 DIAGNOSIS — I10 PRIMARY HYPERTENSION: ICD-10-CM

## 2025-02-12 RX ORDER — METOPROLOL SUCCINATE 25 MG/1
25 TABLET, EXTENDED RELEASE ORAL DAILY
Qty: 30 TABLET | Refills: 0 | Status: SHIPPED | OUTPATIENT
Start: 2025-02-12

## 2025-03-06 DIAGNOSIS — I10 PRIMARY HYPERTENSION: ICD-10-CM

## 2025-03-06 DIAGNOSIS — K21.9 GASTROESOPHAGEAL REFLUX DISEASE, UNSPECIFIED WHETHER ESOPHAGITIS PRESENT: ICD-10-CM

## 2025-03-06 DIAGNOSIS — K52.9 CHRONIC DIARRHEA: ICD-10-CM

## 2025-03-06 RX ORDER — METOPROLOL SUCCINATE 25 MG/1
TABLET, EXTENDED RELEASE ORAL
Qty: 30 TABLET | Refills: 0 | Status: SHIPPED | OUTPATIENT
Start: 2025-03-06 | End: 2025-04-05

## 2025-03-06 RX ORDER — PANTOPRAZOLE SODIUM 40 MG/1
40 TABLET, DELAYED RELEASE ORAL DAILY
Qty: 30 TABLET | Refills: 0 | Status: SHIPPED | OUTPATIENT
Start: 2025-03-06

## 2025-03-06 RX ORDER — METOPROLOL SUCCINATE 25 MG/1
25 TABLET, EXTENDED RELEASE ORAL DAILY
Qty: 30 TABLET | Refills: 0 | Status: SHIPPED | OUTPATIENT
Start: 2025-03-06

## 2025-03-06 RX ORDER — LOPERAMIDE HYDROCHLORIDE 2 MG/1
CAPSULE ORAL
Qty: 120 CAPSULE | Refills: 0 | Status: SHIPPED | OUTPATIENT
Start: 2025-03-06

## 2025-03-23 DIAGNOSIS — E78.5 HYPERLIPIDEMIA, UNSPECIFIED HYPERLIPIDEMIA TYPE: ICD-10-CM

## 2025-03-24 RX ORDER — ATORVASTATIN CALCIUM 80 MG/1
80 TABLET, FILM COATED ORAL NIGHTLY
Qty: 30 TABLET | Refills: 0 | Status: SHIPPED | OUTPATIENT
Start: 2025-03-24

## 2025-04-11 ENCOUNTER — OFFICE VISIT (OUTPATIENT)
Dept: CARDIOLOGY | Facility: HOSPITAL | Age: 60
End: 2025-04-11
Payer: COMMERCIAL

## 2025-04-11 VITALS
BODY MASS INDEX: 34.53 KG/M2 | HEIGHT: 67 IN | OXYGEN SATURATION: 96 % | WEIGHT: 220 LBS | SYSTOLIC BLOOD PRESSURE: 121 MMHG | DIASTOLIC BLOOD PRESSURE: 76 MMHG | HEART RATE: 91 BPM

## 2025-04-11 DIAGNOSIS — I25.10 CORONARY ARTERY DISEASE INVOLVING NATIVE CORONARY ARTERY OF NATIVE HEART WITHOUT ANGINA PECTORIS: Primary | ICD-10-CM

## 2025-04-11 PROCEDURE — G2211 COMPLEX E/M VISIT ADD ON: HCPCS | Performed by: INTERNAL MEDICINE

## 2025-04-11 PROCEDURE — 99214 OFFICE O/P EST MOD 30 MIN: CPT | Performed by: INTERNAL MEDICINE

## 2025-04-11 PROCEDURE — 3078F DIAST BP <80 MM HG: CPT | Performed by: INTERNAL MEDICINE

## 2025-04-11 PROCEDURE — 3008F BODY MASS INDEX DOCD: CPT | Performed by: INTERNAL MEDICINE

## 2025-04-11 PROCEDURE — 3074F SYST BP LT 130 MM HG: CPT | Performed by: INTERNAL MEDICINE

## 2025-04-11 ASSESSMENT — PATIENT HEALTH QUESTIONNAIRE - PHQ9
1. LITTLE INTEREST OR PLEASURE IN DOING THINGS: NOT AT ALL
2. FEELING DOWN, DEPRESSED OR HOPELESS: NOT AT ALL
SUM OF ALL RESPONSES TO PHQ9 QUESTIONS 1 AND 2: 0

## 2025-04-11 ASSESSMENT — COLUMBIA-SUICIDE SEVERITY RATING SCALE - C-SSRS
2. HAVE YOU ACTUALLY HAD ANY THOUGHTS OF KILLING YOURSELF?: NO
1. IN THE PAST MONTH, HAVE YOU WISHED YOU WERE DEAD OR WISHED YOU COULD GO TO SLEEP AND NOT WAKE UP?: NO
6. HAVE YOU EVER DONE ANYTHING, STARTED TO DO ANYTHING, OR PREPARED TO DO ANYTHING TO END YOUR LIFE?: NO

## 2025-04-11 ASSESSMENT — PAIN SCALES - GENERAL: PAINLEVEL_OUTOF10: 0-NO PAIN

## 2025-04-11 ASSESSMENT — ENCOUNTER SYMPTOMS
LOSS OF SENSATION IN FEET: 0
OCCASIONAL FEELINGS OF UNSTEADINESS: 0
DEPRESSION: 0

## 2025-04-11 NOTE — PROGRESS NOTES
Subjective   Marcelino Mallory is a 60 y.o. male who presents for cardiology follow-up on background of CAD withNSTEMI (7/19/2021) s/p PCI/KEAGAN to the prox-mid LAD (90% stenosis), DLD, MARK on CPAP, GERD, ulcerative colitis s/p colectomy.      Investigations:  TTE (7/2021) - LVEF 50-55%, septal-apical hypokinesia.   LHC (7/2021) - LAD prox-mid 90%, LCx small-medium vessel with disease in distal third of vessel. Resolute [place] 3.0 x 26mm KEAGAN deployed to prox/mid LAD.   Peak troponin 6.96.  Nuclear stress test (12/2021) - no ischemia.     Chief Complaint:  FUV, nausea/chest discomfort    HPI  Exercising regularly  - 5x per week, resistance and aerobic.   - no change in exercise capacity at gym.     No further chest pain.   Stress CMR negative for ischemia.     CV risk:   HTN - controlled on lisinopril 5 mg daily.   LDL - 84 mg/dL, on atorvastatin 80mg. No diet restrictions, small portions.  Non-smoker.   HbA1c 5.5%     Shx - semi-retired. 3-4x/week has 3 drinks.   Diet - fairly unrestricted, enjoys red meat.    ROS  A 10-system review was performed and was unremarkable apart from what is presented in the HPI.     Objective   Physical Exam  Alert and orientated.   Appropriate responses, normal affect.  No respiratory distress at rest.   Skin warm and dry.   Normal radial pulse character and volume. Clinically SR.   Anicteric sclera, no conjunctival pallor.   No JVD or carotid bruits.  Heart sounds dual, no added heart sounds or audible murmurs.   Chest clear on auscultation.   Calves soft, non-tender.  No pedal edema.    Lab Review:   Lab Results   Component Value Date     01/17/2024    K 4.2 01/17/2024     01/17/2024    CO2 26 01/17/2024    BUN 10 01/17/2024    CREATININE 1.13 01/17/2024    GLUCOSE 98 01/17/2024    CALCIUM 10.0 01/17/2024     Lab Results   Component Value Date    TROPONINI <0.02 12/20/2021     Lab Results   Component Value Date    WBC 5.4 01/17/2024    HGB 14.4 01/17/2024    HCT 44.4 01/17/2024     MCV 92 01/17/2024     01/17/2024     Lab Results   Component Value Date    CHOL 166 01/17/2024    TRIG 178 (H) 01/17/2024    HDL 46.5 01/17/2024       Assessment/Plan   In summary, Marcelino Mallory is a 59 y.o. male who presents for cardiology follow-up on background of CAD withNSTEMI (7/19/2021) s/p PCI/KEAGAN to the prox-mid LAD (90% stenosis), DLD, MARK on CPAP, GERD, ulcerative colitis s/p colectomy.  He presents with intermittent symptoms of nausea/indigestion in the mornings and he is concerned about an anginal equivalent.  He continues to exercise regularly with no definite change in his exercise capacity at the gym however he has noticed some exertional dyspnea when ascending inclines on his bicycle.  In clinic today he was euvolemic and normotensive.  His EKG showed sinus rhythm with first-degree heart block but no ST-T changes.  Overall his cardiovascular risk factors are satisfactorily controlled apart from an LDL that is slightly above target and addition of ezetimibe will be considered if dietary modification is not effective.  In light of his symptoms I have arranged for an echocardiogram and a stress cardiac MRI to assess for possible ischemia.  I will follow-up with Mr. Mallory after his investigations.

## 2025-04-16 DIAGNOSIS — K21.9 GASTROESOPHAGEAL REFLUX DISEASE, UNSPECIFIED WHETHER ESOPHAGITIS PRESENT: ICD-10-CM

## 2025-04-16 DIAGNOSIS — K52.9 CHRONIC DIARRHEA: ICD-10-CM

## 2025-04-21 ENCOUNTER — PATIENT MESSAGE (OUTPATIENT)
Dept: CARDIOLOGY | Facility: HOSPITAL | Age: 60
End: 2025-04-21
Payer: COMMERCIAL

## 2025-04-21 DIAGNOSIS — E78.5 HYPERLIPIDEMIA, UNSPECIFIED HYPERLIPIDEMIA TYPE: ICD-10-CM

## 2025-04-21 DIAGNOSIS — K21.9 GASTROESOPHAGEAL REFLUX DISEASE, UNSPECIFIED WHETHER ESOPHAGITIS PRESENT: ICD-10-CM

## 2025-04-21 DIAGNOSIS — I10 PRIMARY HYPERTENSION: ICD-10-CM

## 2025-04-21 RX ORDER — LOPERAMIDE HYDROCHLORIDE 2 MG/1
CAPSULE ORAL
Qty: 64 CAPSULE | Refills: 0 | Status: SHIPPED | OUTPATIENT
Start: 2025-04-21

## 2025-04-21 RX ORDER — PANTOPRAZOLE SODIUM 40 MG/1
TABLET, DELAYED RELEASE ORAL
Qty: 21 TABLET | Refills: 0 | Status: SHIPPED | OUTPATIENT
Start: 2025-04-21 | End: 2025-04-21 | Stop reason: SDUPTHER

## 2025-04-21 RX ORDER — PANTOPRAZOLE SODIUM 40 MG/1
40 TABLET, DELAYED RELEASE ORAL
Qty: 90 TABLET | Refills: 3 | Status: SHIPPED | OUTPATIENT
Start: 2025-04-21

## 2025-04-21 RX ORDER — ATORVASTATIN CALCIUM 80 MG/1
80 TABLET, FILM COATED ORAL NIGHTLY
Qty: 90 TABLET | Refills: 3 | Status: SHIPPED | OUTPATIENT
Start: 2025-04-21

## 2025-04-21 RX ORDER — LISINOPRIL 5 MG/1
5 TABLET ORAL DAILY
Qty: 90 TABLET | Refills: 3 | Status: SHIPPED | OUTPATIENT
Start: 2025-04-21

## 2025-04-21 RX ORDER — METOPROLOL SUCCINATE 25 MG/1
25 TABLET, EXTENDED RELEASE ORAL DAILY
Qty: 90 TABLET | Refills: 3 | Status: SHIPPED | OUTPATIENT
Start: 2025-04-21

## 2025-04-22 ENCOUNTER — APPOINTMENT (OUTPATIENT)
Dept: CARDIOLOGY | Facility: HOSPITAL | Age: 60
End: 2025-04-22
Payer: COMMERCIAL

## 2025-05-09 ENCOUNTER — APPOINTMENT (OUTPATIENT)
Dept: PRIMARY CARE | Facility: CLINIC | Age: 60
End: 2025-05-09
Payer: COMMERCIAL

## 2025-05-12 DIAGNOSIS — K21.9 GASTROESOPHAGEAL REFLUX DISEASE, UNSPECIFIED WHETHER ESOPHAGITIS PRESENT: ICD-10-CM

## 2025-05-12 RX ORDER — PANTOPRAZOLE SODIUM 40 MG/1
40 TABLET, DELAYED RELEASE ORAL
Qty: 90 TABLET | Refills: 3 | Status: SHIPPED | OUTPATIENT
Start: 2025-05-12

## 2025-07-08 ENCOUNTER — OFFICE VISIT (OUTPATIENT)
Dept: PRIMARY CARE | Facility: CLINIC | Age: 60
End: 2025-07-08
Payer: COMMERCIAL

## 2025-07-08 VITALS
DIASTOLIC BLOOD PRESSURE: 82 MMHG | HEIGHT: 67 IN | RESPIRATION RATE: 18 BRPM | OXYGEN SATURATION: 97 % | WEIGHT: 216 LBS | HEART RATE: 90 BPM | SYSTOLIC BLOOD PRESSURE: 122 MMHG | TEMPERATURE: 97.4 F | BODY MASS INDEX: 33.9 KG/M2

## 2025-07-08 DIAGNOSIS — K52.9 CHRONIC DIARRHEA: ICD-10-CM

## 2025-07-08 DIAGNOSIS — I25.10 CORONARY ARTERY DISEASE INVOLVING NATIVE CORONARY ARTERY OF NATIVE HEART WITHOUT ANGINA PECTORIS: ICD-10-CM

## 2025-07-08 DIAGNOSIS — I10 PRIMARY HYPERTENSION: ICD-10-CM

## 2025-07-08 DIAGNOSIS — Z90.49 STATUS POST TOTAL COLECTOMY: ICD-10-CM

## 2025-07-08 DIAGNOSIS — G47.33 OBSTRUCTIVE SLEEP APNEA SYNDROME: Primary | ICD-10-CM

## 2025-07-08 PROCEDURE — 3074F SYST BP LT 130 MM HG: CPT

## 2025-07-08 PROCEDURE — 3079F DIAST BP 80-89 MM HG: CPT

## 2025-07-08 PROCEDURE — 3008F BODY MASS INDEX DOCD: CPT

## 2025-07-08 PROCEDURE — 99204 OFFICE O/P NEW MOD 45 MIN: CPT

## 2025-07-08 RX ORDER — LOPERAMIDE HYDROCHLORIDE 2 MG/1
2 CAPSULE ORAL 3 TIMES DAILY
Qty: 90 CAPSULE | Refills: 1 | Status: SHIPPED | OUTPATIENT
Start: 2025-07-08

## 2025-07-08 NOTE — PROGRESS NOTES
"Subjective   Patient ID: Jacobo Mallory is a 60 y.o. male who presents for [facility] (New patient to [facility]).    HPI   History of Present Illness  The patient is a 60-year-old male who presents today to [facility].    He has a history of ulcerative colitis, which led to total colectomy, now has J-pouch. He had an colostomy for a year before which was subsequently converted to J-pouch.  As such he typically has approximately 20 bowel movements per day.  Regularly is taking loperamide for symptom control.  He is currently seeking a new gastroenterologist as his previous one has retired. He is requesting a refill of his loperamide prescription.  Upcoming appointment with GI in September    Has history of NSTEMI s/p stent placement in LAD. Initially, he initially mistook his symptoms for indigestion, experiencing relief after burping.  Had KEAGAN placed July 2021, he has been managing well, follows regularly with cardiology.  Maintains an active lifestyle, including weightlifting, cardio exercises, and cycling.  Is compliant with medications.  He reports no anginal symptoms, denies chest pain or shortness of breath, has not required nitroglycerin recently.  Last visit to cardiology being a few months ago. He is on ASA, lisinopril, metoprolol XL and atorvastatin.    He has sleep apnea and uses a CPAP machine nightly, but is unsure of its effectiveness. He is considering switching to a full face mask as currently uses nasal pillow. Despite using the CPAP machine, he experiences fatigue every afternoon, necessitating a 15 to 20-minute nap. He is interested in consulting a sleep specialist.    PAST SURGICAL HISTORY:  - J-pouch creation following ulcerative colitis  - Stent placement in LAD artery           Review of Systems    Objective   /82   Pulse 90   Temp 36.3 °C (97.4 °F)   Resp 18   Ht 1.702 m (5' 7\")   Wt 98 kg (216 lb)   SpO2 97%   BMI 33.83 kg/m²     Physical Exam  Constitutional:       " General: He is not in acute distress.     Appearance: Normal appearance. He is not ill-appearing.   Eyes:      General: No scleral icterus.  Cardiovascular:      Rate and Rhythm: Normal rate and regular rhythm.      Heart sounds: No murmur heard.     No friction rub. No gallop.   Pulmonary:      Effort: Pulmonary effort is normal. No respiratory distress.      Breath sounds: Normal breath sounds. No wheezing, rhonchi or rales.   Musculoskeletal:      Right lower leg: No edema.      Left lower leg: No edema.   Neurological:      General: No focal deficit present.      Mental Status: He is alert and oriented to person, place, and time.   Psychiatric:         Mood and Affect: Mood normal.         Behavior: Behavior normal.         Assessment/Plan   Problem List Items Addressed This Visit           ICD-10-CM    Obstructive sleep apnea syndrome - Primary G47.33    Relevant Orders    Referral to Adult Sleep Medicine    Coronary artery disease involving native coronary artery of native heart without angina pectoris I25.10    Relevant Orders    Lipid Panel    CBC and Auto Differential    Hypertension I10    Blood pressure well-controlled on current regimen, agree with continuation of lisinopril/metoprolol         Relevant Orders    TSH with reflex to Free T4 if abnormal     Other Visit Diagnoses         Codes      Chronic diarrhea     K52.9    Relevant Medications    loperamide (Imodium) 2 mg capsule      Status post total colectomy     Z90.49    Relevant Orders    Vitamin D 25-Hydroxy,Total (for eval of Vitamin D levels)    Comprehensive Metabolic Panel          Assessment & Plan  1. Ulcerative colitis.  - History of ulcerative colitis status total colectomy now with a J-pouch.  - Has frequent bowel movements as a result of his surgical anatomy.  Ordered electrolyte panel to evaluate for any underlying disturbances.  - Refill for loperamide provided.  - Monitoring symptoms and medication effectiveness.    2. Coronary  artery disease.  - History of heart attack with stent placement in the LAD artery.  - Fasting blood work ordered to monitor LDL levels.  - Consideration of Repatha if LDL remains above 70.  -Has follow-up with cardiology in 9 months    3. Sleep apnea.  - Uses a CPAP machine but feels it may not be working effectively.  - Considering trying a full face mask.  - Referral to Dr. Rodas, a sleep specialist, made for further evaluation.  - Monitoring symptoms and effectiveness of current CPAP therapy.    Follow-up  - Follow-up in 1 month for annual physical and lab review.  Will discuss further augmentation to lipid regimen at that time       Paul Fisher, DO       This medical note was created with the assistance of artificial intelligence (AI) for documentation purposes. The content has been reviewed and confirmed by the healthcare provider for accuracy and completeness. Patient consented to the use of audio recording and use of AI during their visit.

## 2025-07-10 PROBLEM — F41.9 ANXIETY: Status: RESOLVED | Noted: 2024-01-16 | Resolved: 2025-07-10

## 2025-07-10 NOTE — ASSESSMENT & PLAN NOTE
Blood pressure well-controlled on current regimen, agree with continuation of lisinopril/metoprolol

## 2025-07-31 LAB
ALBUMIN SERPL-MCNC: 4.6 G/DL (ref 3.6–5.1)
ALP SERPL-CCNC: 83 U/L (ref 35–144)
ALT SERPL-CCNC: 36 U/L (ref 9–46)
ANION GAP SERPL CALCULATED.4IONS-SCNC: 8 MMOL/L (CALC) (ref 7–17)
AST SERPL-CCNC: 24 U/L (ref 10–35)
BASOPHILS # BLD AUTO: 32 CELLS/UL (ref 0–200)
BASOPHILS NFR BLD AUTO: 0.6 %
BILIRUB SERPL-MCNC: 0.8 MG/DL (ref 0.2–1.2)
BUN SERPL-MCNC: 17 MG/DL (ref 7–25)
CALCIUM SERPL-MCNC: 9.6 MG/DL (ref 8.6–10.3)
CHLORIDE SERPL-SCNC: 104 MMOL/L (ref 98–110)
CO2 SERPL-SCNC: 26 MMOL/L (ref 20–32)
CREAT SERPL-MCNC: 1.11 MG/DL (ref 0.7–1.35)
EGFRCR SERPLBLD CKD-EPI 2021: 76 ML/MIN/1.73M2
EOSINOPHIL # BLD AUTO: 270 CELLS/UL (ref 15–500)
EOSINOPHIL NFR BLD AUTO: 5 %
ERYTHROCYTE [DISTWIDTH] IN BLOOD BY AUTOMATED COUNT: 14 % (ref 11–15)
GLUCOSE SERPL-MCNC: 112 MG/DL (ref 65–99)
HCT VFR BLD AUTO: 43.8 % (ref 38.5–50)
HGB BLD-MCNC: 14.5 G/DL (ref 13.2–17.1)
LYMPHOCYTES # BLD AUTO: 1625 CELLS/UL (ref 850–3900)
LYMPHOCYTES NFR BLD AUTO: 30.1 %
MCH RBC QN AUTO: 30.1 PG (ref 27–33)
MCHC RBC AUTO-ENTMCNC: 33.1 G/DL (ref 32–36)
MCV RBC AUTO: 90.9 FL (ref 80–100)
MONOCYTES # BLD AUTO: 362 CELLS/UL (ref 200–950)
MONOCYTES NFR BLD AUTO: 6.7 %
NEUTROPHILS # BLD AUTO: 3110 CELLS/UL (ref 1500–7800)
NEUTROPHILS NFR BLD AUTO: 57.6 %
PLATELET # BLD AUTO: 287 THOUSAND/UL (ref 140–400)
PMV BLD REES-ECKER: 8.7 FL (ref 7.5–12.5)
POTASSIUM SERPL-SCNC: 4.1 MMOL/L (ref 3.5–5.3)
PROT SERPL-MCNC: 7.4 G/DL (ref 6.1–8.1)
RBC # BLD AUTO: 4.82 MILLION/UL (ref 4.2–5.8)
SODIUM SERPL-SCNC: 138 MMOL/L (ref 135–146)
WBC # BLD AUTO: 5.4 THOUSAND/UL (ref 3.8–10.8)

## 2025-08-07 ENCOUNTER — OFFICE VISIT (OUTPATIENT)
Dept: PRIMARY CARE | Facility: CLINIC | Age: 60
End: 2025-08-07
Payer: COMMERCIAL

## 2025-08-07 ENCOUNTER — APPOINTMENT (OUTPATIENT)
Dept: PRIMARY CARE | Facility: CLINIC | Age: 60
End: 2025-08-07
Payer: COMMERCIAL

## 2025-08-07 VITALS
TEMPERATURE: 97.9 F | HEART RATE: 90 BPM | RESPIRATION RATE: 16 BRPM | BODY MASS INDEX: 33.9 KG/M2 | DIASTOLIC BLOOD PRESSURE: 78 MMHG | WEIGHT: 216 LBS | SYSTOLIC BLOOD PRESSURE: 118 MMHG | OXYGEN SATURATION: 97 % | HEIGHT: 67 IN

## 2025-08-07 DIAGNOSIS — G47.33 OBSTRUCTIVE SLEEP APNEA SYNDROME: ICD-10-CM

## 2025-08-07 DIAGNOSIS — E78.2 MIXED HYPERLIPIDEMIA: ICD-10-CM

## 2025-08-07 DIAGNOSIS — Z12.5 SCREENING FOR PROSTATE CANCER: ICD-10-CM

## 2025-08-07 DIAGNOSIS — R73.01 ELEVATED FASTING GLUCOSE: ICD-10-CM

## 2025-08-07 DIAGNOSIS — I25.10 CORONARY ARTERY DISEASE INVOLVING NATIVE CORONARY ARTERY OF NATIVE HEART WITHOUT ANGINA PECTORIS: Primary | ICD-10-CM

## 2025-08-07 DIAGNOSIS — K52.9 CHRONIC DIARRHEA: ICD-10-CM

## 2025-08-07 DIAGNOSIS — I10 PRIMARY HYPERTENSION: ICD-10-CM

## 2025-08-07 PROBLEM — Z90.49 H/O TOTAL COLECTOMY: Status: ACTIVE | Noted: 2024-01-16

## 2025-08-07 PROCEDURE — 99213 OFFICE O/P EST LOW 20 MIN: CPT

## 2025-08-07 PROCEDURE — 3008F BODY MASS INDEX DOCD: CPT

## 2025-08-07 PROCEDURE — 99396 PREV VISIT EST AGE 40-64: CPT

## 2025-08-07 PROCEDURE — 3078F DIAST BP <80 MM HG: CPT

## 2025-08-07 PROCEDURE — 3074F SYST BP LT 130 MM HG: CPT

## 2025-08-07 RX ORDER — LOPERAMIDE HYDROCHLORIDE 2 MG/1
2 CAPSULE ORAL 3 TIMES DAILY
Qty: 90 CAPSULE | Refills: 1 | Status: SHIPPED | OUTPATIENT
Start: 2025-08-07

## 2025-08-07 SDOH — ECONOMIC STABILITY: GENERAL
WHICH OF THE FOLLOWING DO YOU KNOW HOW TO USE AND HAVE ACCESS TO EVERY DAY? (CHOOSE ALL THAT APPLY): DESKTOP COMPUTER, LAPTOP COMPUTER, OR TABLET WITH BROADBAND INTERNET CONNECTION;SMARTPHONE WITH CELLULAR DATA PLAN

## 2025-08-07 SDOH — HEALTH STABILITY: PHYSICAL HEALTH: ON AVERAGE, HOW MANY DAYS PER WEEK DO YOU ENGAGE IN MODERATE TO STRENUOUS EXERCISE (LIKE A BRISK WALK)?: 7 DAYS

## 2025-08-07 SDOH — ECONOMIC STABILITY: FOOD INSECURITY: WITHIN THE PAST 12 MONTHS, YOU WORRIED THAT YOUR FOOD WOULD RUN OUT BEFORE YOU GOT MONEY TO BUY MORE.: NEVER TRUE

## 2025-08-07 SDOH — ECONOMIC STABILITY: FOOD INSECURITY: WITHIN THE PAST 12 MONTHS, THE FOOD YOU BOUGHT JUST DIDN'T LAST AND YOU DIDN'T HAVE MONEY TO GET MORE.: NEVER TRUE

## 2025-08-07 SDOH — HEALTH STABILITY: PHYSICAL HEALTH: ON AVERAGE, HOW MANY MINUTES DO YOU ENGAGE IN EXERCISE AT THIS LEVEL?: 30 MIN

## 2025-08-07 SDOH — ECONOMIC STABILITY: INCOME INSECURITY: IN THE LAST 12 MONTHS, WAS THERE A TIME WHEN YOU WERE NOT ABLE TO PAY THE MORTGAGE OR RENT ON TIME?: NO

## 2025-08-07 SDOH — ECONOMIC STABILITY: TRANSPORTATION INSECURITY
IN THE PAST 12 MONTHS, HAS THE LACK OF TRANSPORTATION KEPT YOU FROM MEDICAL APPOINTMENTS OR FROM GETTING MEDICATIONS?: NO

## 2025-08-07 SDOH — ECONOMIC STABILITY: TRANSPORTATION INSECURITY
IN THE PAST 12 MONTHS, HAS LACK OF TRANSPORTATION KEPT YOU FROM MEETINGS, WORK, OR FROM GETTING THINGS NEEDED FOR DAILY LIVING?: NO

## 2025-08-07 ASSESSMENT — LIFESTYLE VARIABLES
HOW OFTEN DO YOU HAVE SIX OR MORE DRINKS ON ONE OCCASION: NEVER
HOW OFTEN DO YOU HAVE A DRINK CONTAINING ALCOHOL: 4 OR MORE TIMES A WEEK
HOW MANY STANDARD DRINKS CONTAINING ALCOHOL DO YOU HAVE ON A TYPICAL DAY: 1 OR 2
SKIP TO QUESTIONS 9-10: 1
AUDIT-C TOTAL SCORE: 4

## 2025-08-07 ASSESSMENT — SOCIAL DETERMINANTS OF HEALTH (SDOH)
WITHIN THE LAST YEAR, HAVE YOU BEEN AFRAID OF YOUR PARTNER OR EX-PARTNER?: NO
IN A TYPICAL WEEK, HOW MANY TIMES DO YOU TALK ON THE PHONE WITH FAMILY, FRIENDS, OR NEIGHBORS?: MORE THAN THREE TIMES A WEEK
DO YOU BELONG TO ANY CLUBS OR ORGANIZATIONS SUCH AS CHURCH GROUPS UNIONS, FRATERNAL OR ATHLETIC GROUPS, OR SCHOOL GROUPS?: YES
WITHIN THE LAST YEAR, HAVE YOU BEEN KICKED, HIT, SLAPPED, OR OTHERWISE PHYSICALLY HURT BY YOUR PARTNER OR EX-PARTNER?: NO
HOW OFTEN DO YOU ATTEND CHURCH OR RELIGIOUS SERVICES?: MORE THAN 4 TIMES PER YEAR
IN THE PAST 12 MONTHS, HAS THE ELECTRIC, GAS, OIL, OR WATER COMPANY THREATENED TO SHUT OFF SERVICE IN YOUR HOME?: NO
WITHIN THE LAST YEAR, HAVE YOU BEEN HUMILIATED OR EMOTIONALLY ABUSED IN OTHER WAYS BY YOUR PARTNER OR EX-PARTNER?: NO
HOW OFTEN DO YOU GET TOGETHER WITH FRIENDS OR RELATIVES?: MORE THAN THREE TIMES A WEEK
HOW HARD IS IT FOR YOU TO PAY FOR THE VERY BASICS LIKE FOOD, HOUSING, MEDICAL CARE, AND HEATING?: NOT HARD AT ALL
WITHIN THE LAST YEAR, HAVE TO BEEN RAPED OR FORCED TO HAVE ANY KIND OF SEXUAL ACTIVITY BY YOUR PARTNER OR EX-PARTNER?: NO
HOW OFTEN DO YOU ATTENT MEETINGS OF THE CLUB OR ORGANIZATION YOU BELONG TO?: MORE THAN 4 TIMES PER YEAR

## 2025-08-07 ASSESSMENT — ENCOUNTER SYMPTOMS
DEPRESSION: 0
LOSS OF SENSATION IN FEET: 0
OCCASIONAL FEELINGS OF UNSTEADINESS: 0

## 2025-08-07 ASSESSMENT — PATIENT HEALTH QUESTIONNAIRE - PHQ9
1. LITTLE INTEREST OR PLEASURE IN DOING THINGS: NOT AT ALL
SUM OF ALL RESPONSES TO PHQ9 QUESTIONS 1 & 2: 0
2. FEELING DOWN, DEPRESSED OR HOPELESS: NOT AT ALL

## 2025-08-08 LAB
CHOLEST SERPL-MCNC: 174 MG/DL
CHOLEST/HDLC SERPL: 3.2 (CALC)
EST. AVERAGE GLUCOSE BLD GHB EST-MCNC: 117 MG/DL
EST. AVERAGE GLUCOSE BLD GHB EST-SCNC: 6.5 MMOL/L
HBA1C MFR BLD: 5.7 %
HDLC SERPL-MCNC: 55 MG/DL
LDLC SERPL CALC-MCNC: 100 MG/DL (CALC)
NONHDLC SERPL-MCNC: 119 MG/DL (CALC)
PSA SERPL-MCNC: 0.57 NG/ML
TRIGL SERPL-MCNC: 91 MG/DL
TSH SERPL-ACNC: 1.4 MIU/L (ref 0.4–4.5)

## 2025-08-08 NOTE — PROGRESS NOTES
Subjective   Patient ID: Jacobo Mallory is a 60 y.o. male who presents for Annual Exam (He is fasting).    HPI   History of Present Illness  The patient presents for evaluation of annual physical.    He reports experiencing significant stress and a busy schedule recently. His diet includes small portions, typically consisting of a small piece of sausage or 1 to 2 slices of lauren for breakfast, along with eggs daily. He consumes a considerable amount of red meat and does not regularly monitor his diet in relation to cholesterol. Chicken is his preferred protein source, which he consumes regularly, but not in a fried form. He drinks alcohol 2 to 3 times a week and dines out 4 to 5 times a week. Despite his diet, he maintains a rigorous exercise routine, including weightlifting without rest between reps and cycling approximately 10 miles 4 days a week, with his heart rate reaching up to 160 during these activities. He believes his appetite is smaller than average, often struggling to finish meals at restaurants. He reports no new or worsening headaches, difficulty swallowing, chest discomfort, shortness of breath, urinary changes, numbness, weakness, tingling, or abdominal pain. He visits the dentist biannually.    He has a history of postsurgical site hernias, which he reports are worsening. He discontinued abdominal exercises approximately 15 years ago due to this issue.  Denies any associated discomfort with these or new onset bowel changes/constipation.    He has a history of urinary issues, for which he consulted a urology about 2 years ago. He was informed that his condition was not problematic as long as he could urinate while standing. He typically needs to stand up after a bowel movement to urinate.    He has been referred to sleep specialist for MARK recommendations      He has an upcoming appointment with a gastroenterologist given history of total colectomy/diarrhea associated.    Social History:  Hobbies:  "Weightlifting, cycling  Diet: Small portions, includes sausage or lauren, eggs, red meat, fruits, vegetables, chicken (not fried)  Alcohol: Drinks alcohol 2 to 3 times a week  Tobacco: Smoked cigarettes, quit in 1994      FAMILY HISTORY  His father had ulcerative colitis but never had a heart attack. No family history of diabetes, colon cancer, or prostate issues. His half-sister passed away at 34 years old post-surgery. His half-brother, who is 48 years old, has no health issues. His mother is very healthy.       Review of Systems  12 point review of systems is negative unless stated otherwise in HPI  Objective   /78   Pulse 90   Temp 36.6 °C (97.9 °F)   Resp 16   Ht 1.702 m (5' 7\")   Wt 98 kg (216 lb)   SpO2 97%   BMI 33.83 kg/m²     Physical Exam  Constitutional:       General: He is not in acute distress.     Appearance: He is not ill-appearing.   HENT:      Right Ear: Tympanic membrane normal.      Left Ear: Tympanic membrane normal.      Nose: Nose normal. No congestion.      Mouth/Throat:      Pharynx: No oropharyngeal exudate or posterior oropharyngeal erythema.     Eyes:      General: No scleral icterus.     Extraocular Movements: Extraocular movements intact.       Cardiovascular:      Rate and Rhythm: Normal rate and regular rhythm.      Pulses: Normal pulses.      Heart sounds: Normal heart sounds. No murmur heard.     No friction rub. No gallop.   Pulmonary:      Effort: Pulmonary effort is normal.      Breath sounds: Normal breath sounds. No wheezing, rhonchi or rales.   Abdominal:      Palpations: Abdomen is soft. There is no mass.      Tenderness: There is no abdominal tenderness. There is no guarding.      Comments: Visceral protrusion along the surgical site and midline.  In supine position completely retractable.  No discomfort during abdominal exam     Musculoskeletal:      Cervical back: No tenderness.      Right lower leg: No edema.      Left lower leg: No edema.   Lymphadenopathy: "      Cervical: No cervical adenopathy.     Skin:     General: Skin is warm and dry.      Findings: No rash.     Neurological:      General: No focal deficit present.      Mental Status: He is alert and oriented to person, place, and time.      Cranial Nerves: No cranial nerve deficit.      Deep Tendon Reflexes: Reflexes normal.     Psychiatric:         Mood and Affect: Mood normal.         Behavior: Behavior normal.         Assessment/Plan   Problem List Items Addressed This Visit           ICD-10-CM    Obstructive sleep apnea syndrome G47.33    Coronary artery disease involving native coronary artery of native heart without angina pectoris - Primary I25.10    Relevant Orders    Tsh With Reflex To Free T4 If Abnormal    Primary hypertension I10    Mixed hyperlipidemia E78.2    Relevant Orders    Lipid panel     Other Visit Diagnoses         Codes      Chronic diarrhea     K52.9    Relevant Medications    loperamide (Imodium) 2 mg capsule      Elevated fasting glucose     R73.01    Relevant Orders    Hemoglobin A1c      Screening for prostate cancer     Z12.5    Relevant Orders    PSA          Assessment & Plan  1. Diabetes screening.  - Glucose levels were slightly elevated.  - An A1c test will be conducted to screen for diabetes.  - Blood counts, kidney function, liver function, and electrolyte levels are all within normal ranges.  - Lipid panel and thyroid study will be ordered.    2. Sleep apnea.  - Referred to sleep specialist for MARK management  - Confirmed the referral is documented in the notes.    3.  Incisional site herniation.  - Reports worsening hernia symptoms/increased protrusion.  - Prefers to monitor the condition as long as there is no pain.  - No immediate action required unless symptoms worsen, low risk for incarceration given with of area of herniation.    4. Urinary issues.  - Reports difficulty urinating, especially during bowel movements.  - Previous specialist indicated no significant  problem.  - Prostate blood check will be performed to further investigate.  - Patient will follow up based on blood test results.       Paul Fisher,        This medical note was created with the assistance of artificial intelligence (AI) for documentation purposes. The content has been reviewed and confirmed by the healthcare provider for accuracy and completeness. Patient consented to the use of audio recording and use of AI during their visit.